# Patient Record
Sex: FEMALE | Race: WHITE | NOT HISPANIC OR LATINO | Employment: UNEMPLOYED
[De-identification: names, ages, dates, MRNs, and addresses within clinical notes are randomized per-mention and may not be internally consistent; named-entity substitution may affect disease eponyms.]

---

## 2022-02-06 ENCOUNTER — HEALTH MAINTENANCE LETTER (OUTPATIENT)
Age: 22
End: 2022-02-06

## 2022-09-10 ENCOUNTER — TRANSFERRED RECORDS (OUTPATIENT)
Dept: MULTI SPECIALTY CLINIC | Facility: CLINIC | Age: 22
End: 2022-09-10

## 2022-09-10 LAB — PAP SMEAR - HIM PATIENT REPORTED: NORMAL

## 2022-10-03 ENCOUNTER — HEALTH MAINTENANCE LETTER (OUTPATIENT)
Age: 22
End: 2022-10-03

## 2023-02-12 ENCOUNTER — HEALTH MAINTENANCE LETTER (OUTPATIENT)
Age: 23
End: 2023-02-12

## 2023-12-07 ENCOUNTER — OFFICE VISIT (OUTPATIENT)
Dept: FAMILY MEDICINE | Facility: CLINIC | Age: 23
End: 2023-12-07
Payer: COMMERCIAL

## 2023-12-07 VITALS
RESPIRATION RATE: 18 BRPM | TEMPERATURE: 98.6 F | DIASTOLIC BLOOD PRESSURE: 79 MMHG | HEART RATE: 92 BPM | WEIGHT: 151 LBS | SYSTOLIC BLOOD PRESSURE: 134 MMHG | HEIGHT: 66 IN | BODY MASS INDEX: 24.27 KG/M2 | OXYGEN SATURATION: 99 %

## 2023-12-07 DIAGNOSIS — R53.83 OTHER FATIGUE: Primary | ICD-10-CM

## 2023-12-07 DIAGNOSIS — R00.0 INCREASED HEART RATE: ICD-10-CM

## 2023-12-07 LAB
ALBUMIN SERPL BCG-MCNC: 4.5 G/DL (ref 3.5–5.2)
ALP SERPL-CCNC: 45 U/L (ref 40–150)
ALT SERPL W P-5'-P-CCNC: 14 U/L (ref 0–50)
ANION GAP SERPL CALCULATED.3IONS-SCNC: 12 MMOL/L (ref 7–15)
AST SERPL W P-5'-P-CCNC: 23 U/L (ref 0–45)
BASOPHILS # BLD AUTO: 0 10E3/UL (ref 0–0.2)
BASOPHILS NFR BLD AUTO: 1 %
BILIRUB SERPL-MCNC: 0.9 MG/DL
BUN SERPL-MCNC: 7.7 MG/DL (ref 6–20)
CALCIUM SERPL-MCNC: 9.5 MG/DL (ref 8.6–10)
CHLORIDE SERPL-SCNC: 102 MMOL/L (ref 98–107)
CREAT SERPL-MCNC: 0.91 MG/DL (ref 0.51–0.95)
DEPRECATED HCO3 PLAS-SCNC: 25 MMOL/L (ref 22–29)
EGFRCR SERPLBLD CKD-EPI 2021: 89 ML/MIN/1.73M2
EOSINOPHIL # BLD AUTO: 0.1 10E3/UL (ref 0–0.7)
EOSINOPHIL NFR BLD AUTO: 1 %
ERYTHROCYTE [DISTWIDTH] IN BLOOD BY AUTOMATED COUNT: 11.5 % (ref 10–15)
FERRITIN SERPL-MCNC: 53 NG/ML (ref 6–175)
FOLATE SERPL-MCNC: 26.5 NG/ML (ref 4.6–34.8)
GLUCOSE SERPL-MCNC: 104 MG/DL (ref 70–99)
HBA1C MFR BLD: 5.2 % (ref 0–5.6)
HCT VFR BLD AUTO: 41.4 % (ref 35–47)
HGB BLD-MCNC: 14.3 G/DL (ref 11.7–15.7)
IMM GRANULOCYTES # BLD: 0 10E3/UL
IMM GRANULOCYTES NFR BLD: 0 %
IRON BINDING CAPACITY (ROCHE): 340 UG/DL (ref 240–430)
IRON SATN MFR SERPL: 44 % (ref 15–46)
IRON SERPL-MCNC: 149 UG/DL (ref 37–145)
LYMPHOCYTES # BLD AUTO: 1.9 10E3/UL (ref 0.8–5.3)
LYMPHOCYTES NFR BLD AUTO: 41 %
MCH RBC QN AUTO: 30.6 PG (ref 26.5–33)
MCHC RBC AUTO-ENTMCNC: 34.5 G/DL (ref 31.5–36.5)
MCV RBC AUTO: 89 FL (ref 78–100)
MONOCYTES # BLD AUTO: 0.3 10E3/UL (ref 0–1.3)
MONOCYTES NFR BLD AUTO: 7 %
NEUTROPHILS # BLD AUTO: 2.3 10E3/UL (ref 1.6–8.3)
NEUTROPHILS NFR BLD AUTO: 50 %
PLATELET # BLD AUTO: 246 10E3/UL (ref 150–450)
POTASSIUM SERPL-SCNC: 3.6 MMOL/L (ref 3.4–5.3)
PROT SERPL-MCNC: 7.6 G/DL (ref 6.4–8.3)
RBC # BLD AUTO: 4.67 10E6/UL (ref 3.8–5.2)
SODIUM SERPL-SCNC: 139 MMOL/L (ref 135–145)
T4 FREE SERPL-MCNC: 1.28 NG/DL (ref 0.9–1.7)
TSH SERPL DL<=0.005 MIU/L-ACNC: 6.64 UIU/ML (ref 0.3–4.2)
VIT B12 SERPL-MCNC: 807 PG/ML (ref 232–1245)
VIT D+METAB SERPL-MCNC: 47 NG/ML (ref 20–50)
WBC # BLD AUTO: 4.7 10E3/UL (ref 4–11)

## 2023-12-07 PROCEDURE — 80053 COMPREHEN METABOLIC PANEL: CPT | Performed by: PHYSICIAN ASSISTANT

## 2023-12-07 PROCEDURE — 93000 ELECTROCARDIOGRAM COMPLETE: CPT | Performed by: PHYSICIAN ASSISTANT

## 2023-12-07 PROCEDURE — 84443 ASSAY THYROID STIM HORMONE: CPT | Performed by: PHYSICIAN ASSISTANT

## 2023-12-07 PROCEDURE — 85025 COMPLETE CBC W/AUTO DIFF WBC: CPT | Performed by: PHYSICIAN ASSISTANT

## 2023-12-07 PROCEDURE — 84439 ASSAY OF FREE THYROXINE: CPT | Performed by: PHYSICIAN ASSISTANT

## 2023-12-07 PROCEDURE — 82746 ASSAY OF FOLIC ACID SERUM: CPT | Performed by: PHYSICIAN ASSISTANT

## 2023-12-07 PROCEDURE — 36415 COLL VENOUS BLD VENIPUNCTURE: CPT | Performed by: PHYSICIAN ASSISTANT

## 2023-12-07 PROCEDURE — 83540 ASSAY OF IRON: CPT | Performed by: PHYSICIAN ASSISTANT

## 2023-12-07 PROCEDURE — 82306 VITAMIN D 25 HYDROXY: CPT | Performed by: PHYSICIAN ASSISTANT

## 2023-12-07 PROCEDURE — 82607 VITAMIN B-12: CPT | Performed by: PHYSICIAN ASSISTANT

## 2023-12-07 PROCEDURE — 83036 HEMOGLOBIN GLYCOSYLATED A1C: CPT | Performed by: PHYSICIAN ASSISTANT

## 2023-12-07 PROCEDURE — 99204 OFFICE O/P NEW MOD 45 MIN: CPT | Mod: 25 | Performed by: PHYSICIAN ASSISTANT

## 2023-12-07 PROCEDURE — 83550 IRON BINDING TEST: CPT | Performed by: PHYSICIAN ASSISTANT

## 2023-12-07 PROCEDURE — 82728 ASSAY OF FERRITIN: CPT | Performed by: PHYSICIAN ASSISTANT

## 2023-12-07 RX ORDER — NORETHINDRONE ACETATE AND ETHINYL ESTRADIOL .02; 1 MG/1; MG/1
1 TABLET ORAL DAILY
COMMUNITY
Start: 2023-12-07

## 2023-12-07 ASSESSMENT — PAIN SCALES - GENERAL: PAINLEVEL: NO PAIN (0)

## 2023-12-07 NOTE — PROGRESS NOTES
Assessment & Plan     Other fatigue  Unspecified fatigue and having her from her daily activities such as running.  Reassured by physical exam as well as no necessarily red flag symptoms such as constitutional symptoms.  Check labs as below.  Wide array of differentials including anemia, iron/vitamin deficiency, electrolyte abnormalities, thyroid dysfunction.  Mom with history of prediabetes but could consider diabetes in differential.  A1c.  Reviewed signs symptoms that warrant urgent/emergent re-evaluation.  I will contact her with labs and next steps.  Given episode of blurry vision recommended re-evaluation with eye doctor.    - CBC with platelets and differential  - Comprehensive metabolic panel (BMP + Alb, Alk Phos, ALT, AST, Total. Bili, TP)  - TSH with free T4 reflex  - Vitamin B12  - Folate  - Iron and iron binding capacity  - Ferritin  - Vitamin D Deficiency  - Hemoglobin A1c  - EKG 12-lead complete w/read - Clinics    Increased heart rate  EKG today to assess for underlying arrhythmia.  - EKG 12-lead complete w/read - Clinics      30 minutes spent by me on the date of the encounter on chart review, review of test results, interpretation of tests, patient visit, and documentation         Return if symptoms worsen or fail to improve.    The likelihood of other entities in the differential is insufficient to justify any further testing for them at this time. This was explained to the patient. The patient was advised that persistent or worsening symptoms would require further evaluation. Patient advised to call the office and if unable to reach to go to the emergency room if they develop any new or worsening symptoms. Expressed understanding and agreement with above stated plan.     LILLY Antonio [facility] MARK    [place]   Rosemary Love is a 23 year old female presenting for the following health issues:  Patient presents with:  Fatigue    Here today for evaluation of  "fatigue.    Initial episode occurred spring 2023.  Was training for marathon.  Had to stop training for approximately 3 weeks due to fatigue.  Unable to complete long runs.  Had blood work done and found that her ferritin was 25.  Was having associated headaches as well as dizziness at that time.  Took Sudafed and her symptoms improved.  Had surgery in the summer for deviated septum which helped resolve this issue.  Had over the past few months restarted training.  Averaging 40 to 50 miles per week.  Started having very low energy.  Started supplementing with iron.  Takes B complex. Vegan diet.  Noted an episode in September where she experienced and blurry vision while running.  Has more or less stopped running over the past 3 to 4 weeks due to feeling extremely fatigue following exercise.  Noted higher resting heart rates than usual.  92 today in office.    No recent illnesses or known environmental exposure.  Was tested for tickborne illnesses in the spring which were negative.  Denies fever, chills, night sweats or weight loss.  No chest pain, shortness of breath, or feelings of irregular heartbeat.  No nausea, vomiting or abdominal pain.  Appetite is normal.  No bowel habit changes or blood in the stool.  No numbness/tingling/weakness. Denies depression/anxiety.     Family history unremarkable.  Mom with history of prediabetes.    Review of Systems   Constitutional, HEENT, cardiovascular, pulmonary, GI, , musculoskeletal, neuro, skin, endocrine and psych systems are negative, except as otherwise noted.      Objective    /79 (BP Location: Right arm, Patient Position: Chair, Cuff Size: Adult Regular)   Pulse 92   Temp 98.6  F (37  C) (Temporal)   Resp 18   Ht 1.676 m (5' 6\")   Wt 68.5 kg (151 lb)   LMP  (LMP Unknown)   SpO2 99%   Breastfeeding No   BMI 24.37 kg/m    5' 6\"  151 lbs 0 oz    Physical Exam   GENERAL: healthy, alert and no distress  EYES: Eyes grossly normal to inspection, PERRL and " conjunctivae and sclerae normal  HENT: ear canals and TM's normal, nose and mouth without ulcers or lesions  NECK: no adenopathy, no asymmetry, masses, or scars and thyroid normal to palpation  RESP: lungs clear to auscultation - no rales, rhonchi or wheezes  CV: regular rate and rhythm, normal S1 S2, no S3 or S4, no murmur, click or rub, no peripheral edema and peripheral pulses strong  ABDOMEN: soft, nontender, no hepatosplenomegaly, no masses and bowel sounds normal  MS: no gross musculoskeletal defects noted, no edema  SKIN: no suspicious lesions or rashes  NEURO: Cranial nerves II through XII grossly intact.  Negative Romberg.  Ambulating appropriately without abnormal gait.  Normal strength and tone, mentation intact and speech normal  PSYCH: mentation appears normal, affect normal/bright      Answers submitted by the patient for this visit:  General Questionnaire (Submitted on 12/4/2023)  Chief Complaint: Chronic problems general questions HPI Form  What is the reason for your visit today? : I have had fatigue and slightly blurry vision since early August  How many servings of fruits and vegetables do you eat daily?: 2-3  On average, how many sweetened beverages do you drink each day (Examples: soda, juice, sweet tea, etc.  Do NOT count diet or artificially sweetened beverages)?: 0  How many minutes a day do you exercise enough to make your heart beat faster?: 30 to 60  How many days a week do you exercise enough to make your heart beat faster?: 6  How many days per week do you miss taking your medication?: 0

## 2023-12-08 ENCOUNTER — TELEPHONE (OUTPATIENT)
Dept: FAMILY MEDICINE | Facility: CLINIC | Age: 23
End: 2023-12-08
Payer: COMMERCIAL

## 2023-12-08 DIAGNOSIS — Z30.41 ORAL CONTRACEPTIVE USE: ICD-10-CM

## 2023-12-08 DIAGNOSIS — R00.0 INCREASED HEART RATE: ICD-10-CM

## 2023-12-08 DIAGNOSIS — M79.10 GENERALIZED MUSCLE ACHE: ICD-10-CM

## 2023-12-08 DIAGNOSIS — R53.83 OTHER FATIGUE: Primary | ICD-10-CM

## 2023-12-08 NOTE — TELEPHONE ENCOUNTER
Called patient in regards to recent blood work:     Normal CBC, CMP, vitamins including B12/folate/vitamin D.  No signs of diabetes.  Slight elevation in TSH but normal T4 consistent with subclinical hypothyroidism which is unlikely to be causing her symptoms.    Iron studies are normal.  Taking daily iron supplementation.  States she did feel little bit better today overall since our visit.  Increased energy.  Last vision changes.  Did not attempt to go for a run today.    Option to pursue more blood work as far as inflammatory markers, CK, autoimmune, D-dimer to rule out potential blood clots given increased heart rate, on OCP, fatigue.  Plan to pursue this blood work.  Encouraged eye exam.  Further neuro exam/studies if all negative?  Will await labs.

## 2023-12-11 ENCOUNTER — LAB (OUTPATIENT)
Dept: LAB | Facility: CLINIC | Age: 23
End: 2023-12-11
Payer: COMMERCIAL

## 2023-12-11 DIAGNOSIS — Z30.41 ORAL CONTRACEPTIVE USE: ICD-10-CM

## 2023-12-11 DIAGNOSIS — R53.83 OTHER FATIGUE: ICD-10-CM

## 2023-12-11 DIAGNOSIS — R00.0 INCREASED HEART RATE: ICD-10-CM

## 2023-12-11 LAB
D DIMER PPP FEU-MCNC: 0.34 UG/ML FEU (ref 0–0.5)
ERYTHROCYTE [SEDIMENTATION RATE] IN BLOOD BY WESTERGREN METHOD: 9 MM/HR (ref 0–20)

## 2023-12-11 PROCEDURE — 85379 FIBRIN DEGRADATION QUANT: CPT

## 2023-12-11 PROCEDURE — 86618 LYME DISEASE ANTIBODY: CPT

## 2023-12-11 PROCEDURE — 36415 COLL VENOUS BLD VENIPUNCTURE: CPT

## 2023-12-11 PROCEDURE — 86038 ANTINUCLEAR ANTIBODIES: CPT

## 2023-12-11 PROCEDURE — 86140 C-REACTIVE PROTEIN: CPT

## 2023-12-11 PROCEDURE — 82550 ASSAY OF CK (CPK): CPT

## 2023-12-11 PROCEDURE — 85652 RBC SED RATE AUTOMATED: CPT

## 2023-12-12 LAB
ANA SER QL IF: NEGATIVE
CK SERPL-CCNC: 72 U/L (ref 26–192)
CRP SERPL-MCNC: 3.77 MG/L

## 2023-12-12 NOTE — RESULT ENCOUNTER NOTE
Rosemary,    The CK, KORI and CRP were normal as well.    Kalli Fang PA-C  Covering for Johnathon Kirk PA-C

## 2023-12-12 NOTE — RESULT ENCOUNTER NOTE
Rosemary,    Your D dimer is in normal range.  Your sedimentation rate is normal.  The other labs are pending.    Kalli Fang PA-C  Covering for Johnathon Kirk PA-C

## 2023-12-13 ENCOUNTER — MYC MEDICAL ADVICE (OUTPATIENT)
Dept: FAMILY MEDICINE | Facility: CLINIC | Age: 23
End: 2023-12-13
Payer: COMMERCIAL

## 2023-12-13 DIAGNOSIS — R53.83 OTHER FATIGUE: Primary | ICD-10-CM

## 2023-12-14 LAB — B BURGDOR IGG+IGM SER QL: 0.1

## 2023-12-14 NOTE — TELEPHONE ENCOUNTER
Triage RN called and spoke to [facility], clarifying if the Lyme's test could be added on to the blood work from 12/11/23.     Lennie states she will be able to add the test onto the blood work completed 3 days ago. Patient does not have to come back into the lab.     Luz Maria Kern RN

## 2023-12-15 NOTE — RESULT ENCOUNTER NOTE
Jonnathan Riley,     Lyme test negative as well!  We can set something up for next week or the week after to reassess.    Johnathon

## 2023-12-19 ENCOUNTER — OFFICE VISIT (OUTPATIENT)
Dept: FAMILY MEDICINE | Facility: CLINIC | Age: 23
End: 2023-12-19
Payer: COMMERCIAL

## 2023-12-19 VITALS
HEIGHT: 66 IN | RESPIRATION RATE: 18 BRPM | HEART RATE: 89 BPM | TEMPERATURE: 97.7 F | OXYGEN SATURATION: 100 % | DIASTOLIC BLOOD PRESSURE: 78 MMHG | BODY MASS INDEX: 24.32 KG/M2 | SYSTOLIC BLOOD PRESSURE: 119 MMHG | WEIGHT: 151.3 LBS

## 2023-12-19 DIAGNOSIS — E03.8 SUBCLINICAL HYPOTHYROIDISM: ICD-10-CM

## 2023-12-19 DIAGNOSIS — R00.0 INCREASED HEART RATE: ICD-10-CM

## 2023-12-19 DIAGNOSIS — R53.83 OTHER FATIGUE: Primary | ICD-10-CM

## 2023-12-19 PROCEDURE — 99214 OFFICE O/P EST MOD 30 MIN: CPT | Performed by: PHYSICIAN ASSISTANT

## 2023-12-19 RX ORDER — LEVOTHYROXINE SODIUM 25 UG/1
25 TABLET ORAL DAILY
Qty: 30 TABLET | Refills: 2 | Status: SHIPPED | OUTPATIENT
Start: 2023-12-19 | End: 2024-02-01

## 2023-12-19 ASSESSMENT — PAIN SCALES - GENERAL: PAINLEVEL: NO PAIN (0)

## 2023-12-19 NOTE — PROGRESS NOTES
Assessment & Plan     Other fatigue  Increased heart rate  Given severe fatigue with exercise ordered echo to rule out cardiomyopathy. Close follow-up if worsening or new symptoms   - Echocardiogram Complete    Subclinical hypothyroidism  Demonstrating signs of hypothyroid without over hypothyroidism. Willing to try low dose thyroid hormone. TSH, T4, antibodies added for 1 month to reassess. Discussed proper taking.   - levothyroxine (SYNTHROID/LEVOTHROID) 25 MCG tablet  Dispense: 30 tablet; Refill: 2  - TSH  - T4, free  - Thyroid peroxidase antibody      30 minutes spent by me on the date of the encounter on chart review, review of test results, interpretation of tests, patient visit, and documentation         No follow-ups on file.    The likelihood of other entities in the differential is insufficient to justify any further testing for them at this time. This was explained to the patient. The patient was advised that persistent or worsening symptoms would require further evaluation. Patient advised to call the office and if unable to reach to go to the emergency room if they develop any new or worsening symptoms. Expressed understanding and agreement with above stated plan.     Johnathon Kirk PA-C  [facility]   Rosemary SERGE Love is a 23 year old female presenting for the following health issues:  Patient presents with:  Follow Up    Fatigue is on-going. Extreme exhaustion after walks - needing to nap. Previously very active marathoner. Increase baseline HR. Question inactivity? Denies chest pain, syncope, pre-syncope, SOB/LANDRY.    Appetite stable. Notes stress around on-going symptoms but denies depression/anxiety.   Reviewed labs - subclinical hypothyroid. Brain fog associated with above fatigue. No headaches. Vision stable- did see eye doctor recently without any issues.     Review of Systems   Constitutional, HEENT, cardiovascular, pulmonary, GI, , musculoskeletal, neuro,  "skin, endocrine and psych systems are negative, except as otherwise noted.      Objective    /78   Pulse 89   Temp 97.7  F (36.5  C) (Temporal)   Resp 18   Ht 1.676 m (5' 6\")   Wt 68.6 kg (151 lb 4.8 oz)   LMP  (LMP Unknown)   SpO2 100%   BMI 24.42 kg/m    5' 6\"  151 lbs 4.8 oz    Physical Exam   GENERAL: healthy, alert and no distress  EYES: Eyes grossly normal to inspection, PERRL and conjunctivae and sclerae normal  NECK: no adenopathy, no asymmetry, masses, or scars and thyroid normal to palpation  RESP: lungs clear to auscultation - no rales, rhonchi or wheezes  CV: regular rate and rhythm, normal S1 S2, no S3 or S4, no murmur, click or rub, no peripheral edema  MS: no gross musculoskeletal defects noted, no edema  SKIN: no suspicious lesions or rashes  NEURO: Normal strength and tone, mentation intact and speech normal  PSYCH: mentation appears normal, affect normal/bright      Answers submitted by the patient for this visit:  General Questionnaire (Submitted on 12/18/2023)  Chief Complaint: Chronic problems general questions HPI Form  What is the reason for your visit today? : Follow up for fatigue issue  How many servings of fruits and vegetables do you eat daily?: 2-3  On average, how many sweetened beverages do you drink each day (Examples: soda, juice, sweet tea, etc.  Do NOT count diet or artificially sweetened beverages)?: 0  How many minutes a day do you exercise enough to make your heart beat faster?: 30 to 60  How many days a week do you exercise enough to make your heart beat faster?: 6  How many days per week do you miss taking your medication?: 0    "

## 2024-01-23 ENCOUNTER — LAB (OUTPATIENT)
Dept: LAB | Facility: CLINIC | Age: 24
End: 2024-01-23
Payer: COMMERCIAL

## 2024-01-23 DIAGNOSIS — E03.8 SUBCLINICAL HYPOTHYROIDISM: ICD-10-CM

## 2024-01-23 LAB
T4 FREE SERPL-MCNC: 1.24 NG/DL (ref 0.9–1.7)
TSH SERPL DL<=0.005 MIU/L-ACNC: 2.88 UIU/ML (ref 0.3–4.2)

## 2024-01-23 PROCEDURE — 36415 COLL VENOUS BLD VENIPUNCTURE: CPT

## 2024-01-23 PROCEDURE — 84443 ASSAY THYROID STIM HORMONE: CPT

## 2024-01-23 PROCEDURE — 86376 MICROSOMAL ANTIBODY EACH: CPT

## 2024-01-23 PROCEDURE — 84439 ASSAY OF FREE THYROXINE: CPT

## 2024-01-24 LAB — THYROPEROXIDASE AB SERPL-ACNC: 57 IU/ML

## 2024-01-25 NOTE — RESULT ENCOUNTER NOTE
Jonnathan Riley,     How are you feeling since starting the thyroid hormone? Definite improvement in your TSH/T4. At a good level. TPO antibodies are elevated which is indicative of some type of autoimmune process occurring in your thyroid.     Johnathon

## 2024-02-01 ENCOUNTER — VIRTUAL VISIT (OUTPATIENT)
Dept: FAMILY MEDICINE | Facility: CLINIC | Age: 24
End: 2024-02-01
Payer: COMMERCIAL

## 2024-02-01 DIAGNOSIS — E03.8 SUBCLINICAL HYPOTHYROIDISM: Primary | ICD-10-CM

## 2024-02-01 DIAGNOSIS — R53.83 OTHER FATIGUE: ICD-10-CM

## 2024-02-01 PROCEDURE — 99442 PR PHYSICIAN TELEPHONE EVALUATION 11-20 MIN: CPT | Performed by: PHYSICIAN ASSISTANT

## 2024-02-01 RX ORDER — LEVOTHYROXINE SODIUM 25 UG/1
25 TABLET ORAL DAILY
Qty: 90 TABLET | Refills: 1 | Status: SHIPPED | OUTPATIENT
Start: 2024-02-01

## 2024-02-01 NOTE — PROGRESS NOTES
Rosemary is a 23 year old who is being evaluated via a billable telephone visit.      What phone number would you like to be contacted at? [phone]  How would you like to obtain your AVS? Jorge    Distant Location (provider location):  On-site    Assessment & Plan     Subclinical hypothyroidism  Other fatigue    Continue Synthroid.  Discussed proper taking once again.  Requesting #90 day supply.  Sent to pharmacy.  Repeat TSH/T4 in 6 to 8 weeks to assure continued adequate levels.  Close follow-up with any new or worsening symptoms.  Okay to hold off on echo for now especially given significant improvement in fatigue, daily life.    - levothyroxine (SYNTHROID/LEVOTHROID) 25 MCG tablet  Dispense: 90 tablet; Refill: 1  - TSH  - T4, free    12 minutes spent by me on the date of the encounter doing chart review, review of test results, interpretation of tests, patient visit, and documentation       Subjective   Rosemary is a 23 year old, presenting for the following health issues:  Follow Up    Here today for follow-up in regards to hypothyroidism.    Significant workup for very severe fatigue over the past year.  TSH 6.64, normal T4 consistent with subclinical hypothyroidism.  Given severe fatigue without any other identified cause elected to treat with 25 mcg of Synthroid.  Since starting this a few weeks ago has noted significant improvement in her ability to exercise.  Back to working out 6 days a week as well as walking 4 miles a day.  Has yet to fully return to her regular capacity with running.  Still noted pretty decent fatigue following running.  Improvement in headaches, hair growing back, no longer having body aches all of which she was unaware of until starting Synthroid.    Repeat TSH and T4 on 1/23/2024 showed that these had returned to normal.  TPO antibodies elevated.    Looking forward to an upcoming ski trip in [place]    History of Present Illness       Hypothyroidism:     Since last visit, patient  describes the following symptoms::  Fatigue    She eats 2-3 servings of fruits and vegetables daily.She consumes 0 sweetened beverage(s) daily.She exercises with enough effort to increase her heart rate 30 to 60 minutes per day.  She exercises with enough effort to increase her heart rate 6 days per week.   She is taking medications regularly.         Review of Systems  Constitutional, neuro, ENT, endocrine, pulmonary, cardiac, gastrointestinal, genitourinary, musculoskeletal, integument and psychiatric systems are negative, except as otherwise noted.      Objective    Vitals - Patient Reported  Pain Score: No Pain (0)      Vitals:  No vitals were obtained today due to virtual visit.    Physical Exam   General: Alert and no distress //Respiratory: No audible wheeze, cough, or shortness of breath // Psychiatric:  Appropriate affect, tone, and pace of words        Phone call duration: 10 minutes  Signed Electronically by: Johnathon Kirk PA-C

## 2024-03-10 ENCOUNTER — HEALTH MAINTENANCE LETTER (OUTPATIENT)
Age: 24
End: 2024-03-10

## 2024-03-11 ENCOUNTER — LAB (OUTPATIENT)
Dept: LAB | Facility: CLINIC | Age: 24
End: 2024-03-11
Payer: COMMERCIAL

## 2024-03-11 DIAGNOSIS — E03.8 SUBCLINICAL HYPOTHYROIDISM: ICD-10-CM

## 2024-03-11 LAB
T4 FREE SERPL-MCNC: 1.12 NG/DL (ref 0.9–1.7)
TSH SERPL DL<=0.005 MIU/L-ACNC: 7.63 UIU/ML (ref 0.3–4.2)

## 2024-03-11 PROCEDURE — 36415 COLL VENOUS BLD VENIPUNCTURE: CPT

## 2024-03-11 PROCEDURE — 84443 ASSAY THYROID STIM HORMONE: CPT

## 2024-03-11 PROCEDURE — 84439 ASSAY OF FREE THYROXINE: CPT

## 2024-03-14 ENCOUNTER — VIRTUAL VISIT (OUTPATIENT)
Dept: FAMILY MEDICINE | Facility: CLINIC | Age: 24
End: 2024-03-14
Payer: COMMERCIAL

## 2024-03-14 DIAGNOSIS — E03.8 SUBCLINICAL HYPOTHYROIDISM: Primary | ICD-10-CM

## 2024-03-14 DIAGNOSIS — D64.9 ANEMIA, UNSPECIFIED TYPE: ICD-10-CM

## 2024-03-14 PROCEDURE — 99213 OFFICE O/P EST LOW 20 MIN: CPT | Mod: 95 | Performed by: PHYSICIAN ASSISTANT

## 2024-03-14 RX ORDER — LEVOTHYROXINE SODIUM 50 UG/1
50 TABLET ORAL DAILY
Qty: 90 TABLET | Refills: 1 | Status: SHIPPED | OUTPATIENT
Start: 2024-03-14

## 2024-03-14 NOTE — PROGRESS NOTES
Rosemary is a 23 year old who is being evaluated via a billable video visit.    How would you like to obtain your AVS? MyChart  If the video visit is dropped, the invitation should be resent by: Text to cell phone: [phone]  Will anyone else be joining your video visit? No      Assessment & Plan     Subclinical hypothyroidism  Repeat TSH/T4 prior to her leaving to [place].  Start Synthroid 50 mcg daily.  Stop 25 mcg.  Encouraged her to establish care with primary out in [place] to continue this.  - TSH  - T4, free  - levothyroxine (SYNTHROID/LEVOTHROID) 50 MCG tablet  Dispense: 90 tablet; Refill: 1    Anemia, unspecified type  Supplementing every other day.  Will check levels.  Encouraged her to keep an eye on this going forward as she ramps up her training once again for an ultramarathon.  - CBC with platelets and differential  - Iron and iron binding capacity  - Ferritin    20 minutes spent by me on the date of the encounter doing chart review, review of test results, interpretation of tests, patient visit, and documentation     Subjective   Rosemary is a 23 year old, presenting for the following health issues:  Recheck Medication    Here today for medication follow-up.  Currently taking 25 mcg of Synthroid.  T4 1.12 With recent TSH 7.63.  Worsening level since last time.  Has noted returning of some of the fatigue that she initially experienced over the past few weeks.    Enjoyed her time in [place] as well as [place].  Doing lots of running, hiking, and cross-country skiing.    Plans to move to [place] in the next 3 weeks permanently.  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    History of Present Illness       Hypothyroidism:     Since last visit, patient describes the following symptoms::  None    She eats 2-3 servings of fruits and vegetables daily.She consumes 0 sweetened beverage(s) daily.She exercises with enough effort to increase her heart rate 30 to 60 minutes per day.  She exercises with  enough effort to increase her heart rate 6 days per week.   She is taking medications regularly.         Review of Systems  Constitutional, neuro, ENT, endocrine, pulmonary, cardiac, gastrointestinal, genitourinary, musculoskeletal, integument and psychiatric systems are negative, except as otherwise noted.      Objective    Vitals - Patient Reported  Pain Score: No Pain (0)      Vitals:  No vitals were obtained today due to virtual visit.    Physical Exam   GENERAL: alert and no distress  EYES: Eyes grossly normal to inspection.  No discharge or erythema, or obvious scleral/conjunctival abnormalities.  RESP: No audible wheeze, cough, or visible cyanosis.    SKIN: Visible skin clear. No significant rash, abnormal pigmentation or lesions.  NEURO: Cranial nerves grossly intact.  Mentation and speech appropriate for age.  PSYCH: Appropriate affect, tone, and pace of words        Video-Visit Details    Type of service:  Video Visit   Originating Location (pt. Location): Home    Distant Location (provider location):  On-site  Platform used for Video Visit: Norma  Signed Electronically by: Johnathon Kirk PA-C

## 2024-04-05 ENCOUNTER — LAB (OUTPATIENT)
Dept: LAB | Facility: CLINIC | Age: 24
End: 2024-04-05
Payer: COMMERCIAL

## 2024-04-05 DIAGNOSIS — E03.8 SUBCLINICAL HYPOTHYROIDISM: ICD-10-CM

## 2024-04-05 DIAGNOSIS — Z11.4 SCREENING FOR HIV (HUMAN IMMUNODEFICIENCY VIRUS): Primary | ICD-10-CM

## 2024-04-05 DIAGNOSIS — Z11.59 NEED FOR HEPATITIS C SCREENING TEST: ICD-10-CM

## 2024-04-05 DIAGNOSIS — D64.9 ANEMIA, UNSPECIFIED TYPE: ICD-10-CM

## 2024-04-05 LAB
BASOPHILS # BLD AUTO: 0 10E3/UL (ref 0–0.2)
BASOPHILS NFR BLD AUTO: 1 %
EOSINOPHIL # BLD AUTO: 0.1 10E3/UL (ref 0–0.7)
EOSINOPHIL NFR BLD AUTO: 1 %
ERYTHROCYTE [DISTWIDTH] IN BLOOD BY AUTOMATED COUNT: 11.8 % (ref 10–15)
FERRITIN SERPL-MCNC: 29 NG/ML (ref 6–175)
HCT VFR BLD AUTO: 39.1 % (ref 35–47)
HCV AB SERPL QL IA: NONREACTIVE
HGB BLD-MCNC: 13 G/DL (ref 11.7–15.7)
HIV 1+2 AB+HIV1 P24 AG SERPL QL IA: NONREACTIVE
IMM GRANULOCYTES # BLD: 0 10E3/UL
IMM GRANULOCYTES NFR BLD: 0 %
IRON BINDING CAPACITY (ROCHE): 339 UG/DL (ref 240–430)
IRON SATN MFR SERPL: 40 % (ref 15–46)
IRON SERPL-MCNC: 137 UG/DL (ref 37–145)
LYMPHOCYTES # BLD AUTO: 1.9 10E3/UL (ref 0.8–5.3)
LYMPHOCYTES NFR BLD AUTO: 31 %
MCH RBC QN AUTO: 30.6 PG (ref 26.5–33)
MCHC RBC AUTO-ENTMCNC: 33.2 G/DL (ref 31.5–36.5)
MCV RBC AUTO: 92 FL (ref 78–100)
MONOCYTES # BLD AUTO: 0.4 10E3/UL (ref 0–1.3)
MONOCYTES NFR BLD AUTO: 6 %
NEUTROPHILS # BLD AUTO: 3.7 10E3/UL (ref 1.6–8.3)
NEUTROPHILS NFR BLD AUTO: 61 %
PLATELET # BLD AUTO: 233 10E3/UL (ref 150–450)
RBC # BLD AUTO: 4.25 10E6/UL (ref 3.8–5.2)
T4 FREE SERPL-MCNC: 1.43 NG/DL (ref 0.9–1.7)
TSH SERPL DL<=0.005 MIU/L-ACNC: 5.13 UIU/ML (ref 0.3–4.2)
WBC # BLD AUTO: 6.1 10E3/UL (ref 4–11)

## 2024-04-05 PROCEDURE — 87389 HIV-1 AG W/HIV-1&-2 AB AG IA: CPT

## 2024-04-05 PROCEDURE — 85025 COMPLETE CBC W/AUTO DIFF WBC: CPT

## 2024-04-05 PROCEDURE — 84443 ASSAY THYROID STIM HORMONE: CPT

## 2024-04-05 PROCEDURE — 36415 COLL VENOUS BLD VENIPUNCTURE: CPT

## 2024-04-05 PROCEDURE — 83540 ASSAY OF IRON: CPT

## 2024-04-05 PROCEDURE — 86803 HEPATITIS C AB TEST: CPT

## 2024-04-05 PROCEDURE — 83550 IRON BINDING TEST: CPT

## 2024-04-05 PROCEDURE — 82728 ASSAY OF FERRITIN: CPT

## 2024-04-05 PROCEDURE — 84439 ASSAY OF FREE THYROXINE: CPT

## 2024-04-08 NOTE — RESULT ENCOUNTER NOTE
Jonnathan Riley,     Good luck with the move!     Thyroid trending the right direction after our adjustment in dosing.     Stable iron studies however slightly lower.    Let me know if you have any questions or concerns,     Johnathon Kirk PA-C  [facility]

## 2025-03-16 ENCOUNTER — HEALTH MAINTENANCE LETTER (OUTPATIENT)
Age: 25
End: 2025-03-16